# Patient Record
Sex: MALE | URBAN - METROPOLITAN AREA
[De-identification: names, ages, dates, MRNs, and addresses within clinical notes are randomized per-mention and may not be internally consistent; named-entity substitution may affect disease eponyms.]

---

## 2021-12-08 ENCOUNTER — APPOINTMENT (OUTPATIENT)
Dept: URBAN - METROPOLITAN AREA CLINIC 273 | Age: 36
Setting detail: DERMATOLOGY
End: 2021-12-08

## 2021-12-08 DIAGNOSIS — B86 SCABIES: ICD-10-CM

## 2021-12-08 PROCEDURE — 99202 OFFICE O/P NEW SF 15 MIN: CPT

## 2021-12-08 PROCEDURE — OTHER COUNSELING: OTHER

## 2021-12-08 PROCEDURE — OTHER PRESCRIPTION: OTHER

## 2021-12-08 PROCEDURE — OTHER ADDITIONAL NOTES: OTHER

## 2021-12-08 RX ORDER — TRIAMCINOLONE ACETONIDE 1 MG/G
CREAM TOPICAL
Qty: 454 | Refills: 0 | Status: ERX | COMMUNITY
Start: 2021-12-08

## 2021-12-08 RX ORDER — IVERMECTIN 3 MG/1
TABLET ORAL
Qty: 12 | Refills: 0 | Status: ERX | COMMUNITY
Start: 2021-12-08

## 2021-12-08 ASSESSMENT — LOCATION ZONE DERM: LOCATION ZONE: TRUNK

## 2021-12-08 ASSESSMENT — LOCATION DETAILED DESCRIPTION DERM: LOCATION DETAILED: EPIGASTRIC SKIN

## 2021-12-08 ASSESSMENT — LOCATION SIMPLE DESCRIPTION DERM: LOCATION SIMPLE: ABDOMEN

## 2021-12-08 NOTE — HPI: RASH
What Type Of Note Output Would You Prefer (Optional)?: Bullet Format
Is The Patient Presenting As Previously Scheduled?: No, they are a work-in
How Severe Is Your Rash?: mild
Is This A New Presentation, Or A Follow-Up?: Rash
Additional History: Recently went to the ED for rash. Treated for scabies with permethrin. Rash still remains. Entire family of 4 has the rash.

## 2021-12-08 NOTE — PROCEDURE: ADDITIONAL NOTES
Additional Notes: Pt was previously treated with permethrin without improvement. We used an  service today to speak with patient
Detail Level: Simple
Render Risk Assessment In Note?: no